# Patient Record
Sex: FEMALE | Race: WHITE | NOT HISPANIC OR LATINO | Employment: UNEMPLOYED | URBAN - METROPOLITAN AREA
[De-identification: names, ages, dates, MRNs, and addresses within clinical notes are randomized per-mention and may not be internally consistent; named-entity substitution may affect disease eponyms.]

---

## 2023-09-28 ENCOUNTER — TRANSCRIBE ORDERS (OUTPATIENT)
Dept: GENERAL RADIOLOGY | Facility: HOSPITAL | Age: 23
End: 2023-09-28
Payer: COMMERCIAL

## 2023-09-28 DIAGNOSIS — H47.10 PAPILLEDEMA: Primary | ICD-10-CM

## 2023-10-26 ENCOUNTER — HOSPITAL ENCOUNTER (OUTPATIENT)
Dept: GENERAL RADIOLOGY | Facility: HOSPITAL | Age: 23
Discharge: HOME | End: 2023-10-26
Admitting: OPTOMETRIST
Payer: COMMERCIAL

## 2023-10-26 VITALS
SYSTOLIC BLOOD PRESSURE: 120 MMHG | BODY MASS INDEX: 35.85 KG/M2 | HEIGHT: 64 IN | DIASTOLIC BLOOD PRESSURE: 80 MMHG | RESPIRATION RATE: 14 BRPM | OXYGEN SATURATION: 98 % | WEIGHT: 210 LBS | HEART RATE: 83 BPM | TEMPERATURE: 98 F

## 2023-10-26 DIAGNOSIS — H47.10 PAPILLEDEMA: ICD-10-CM

## 2023-10-26 LAB
APPEARANCE CSF: CLEAR
COLOR CSF: COLORLESS
GLUCOSE CSF-MCNC: 57 MG/DL (ref 40–70)
METHOD: NORMAL
NUC CELL # CSF MANUAL: 0 /MM3 (ref 0–5)
PROT CSF-MCNC: 17.6 MG/DL (ref 15–45)
RBC # CSF MANUAL: 0 /MM3 (ref 0–0)
SPECIMEN VOL CSF: 2 ML
TUBE # CSF: 3

## 2023-10-26 PROCEDURE — 89050 BODY FLUID CELL COUNT: CPT | Performed by: OPTOMETRIST

## 2023-10-26 PROCEDURE — 82945 GLUCOSE OTHER FLUID: CPT | Performed by: OPTOMETRIST

## 2023-10-26 PROCEDURE — 84157 ASSAY OF PROTEIN OTHER: CPT | Performed by: OPTOMETRIST

## 2023-10-26 PROCEDURE — 25010000002 LIDOCAINE 1 % SOLUTION: Performed by: RADIOLOGY

## 2023-10-26 RX ORDER — ALPRAZOLAM 0.5 MG/1
0.5 TABLET ORAL ONCE
Status: COMPLETED | OUTPATIENT
Start: 2023-10-26 | End: 2023-10-26

## 2023-10-26 RX ORDER — LIDOCAINE HYDROCHLORIDE 10 MG/ML
10 INJECTION, SOLUTION INFILTRATION; PERINEURAL ONCE
Status: COMPLETED | OUTPATIENT
Start: 2023-10-26 | End: 2023-10-26

## 2023-10-26 RX ORDER — MECLIZINE HYDROCHLORIDE 25 MG/1
25 TABLET ORAL 3 TIMES DAILY PRN
COMMUNITY

## 2023-10-26 RX ADMIN — ALPRAZOLAM 0.5 MG: 0.5 TABLET ORAL at 12:51

## 2023-10-26 RX ADMIN — LIDOCAINE HYDROCHLORIDE 3 ML: 10 INJECTION, SOLUTION INFILTRATION; PERINEURAL at 13:42

## 2023-10-26 NOTE — INTERVAL H&P NOTE
H&P reviewed. The patient was examined and there are no changes to the H&P.   Unremark.for intended procedure

## 2023-10-26 NOTE — DISCHARGE INSTRUCTIONS
EDUCATION /DISCHARGE INSTRUCTION   A lumbar puncture involves insertion of a sterile needle into the spinal canal by the physician   This procedure is performed for several reasons: to detect increased intracranial pressure, the presence of blood in cerebrospinal fluid (CFS), to obtain CSF specimens for laboratory studies, to administer drugs and to relieve pressure by removing CSF.    You will lie on your stomach with a pillow under your stomach.  This opens the vertebral space to allow access to the spinal needle.  The physician will sterilize the area using antiseptic solution and numb the area where the spinal needle will be placed.  If CSF is being removed for specimen, you may be asked to push or beardown to assist with the flow of the CSF. When the procedure is complete, a band aid will be placed over the injection site and you will be taken to the recovery area.    POTENTIAL RISKS OF A LUMBAR PUNCTURE INCLUDE BUT ARE NOT LIMITED TO:  *  Headache    *  Swelling at puncture site  *  Bleeding into the spinal canal *  Temporary difficulty urinating  *  Leakage of CSF   *  Fever  *  Pain caused by nerve irritation    BENEFIT OF PROCEDURE:  This is the only way to obtain spinal fluid or relieve pressure due to increased CSF.  There is no alternative.    FOLLOWING A LUMBAR PUNCTURE:  *  Drink plenty of liquids -  Caffeine is recommended   *  Lie down flat for the next 8 hours.  If you get a headache, lie down flat for 24 hours, continue to force fluids and call your doctor if  the headache persists.  *  Go straight home.  DO NOT DRIVE    CALL YOUR DOCTOR IF YOU HAVE:  *  A severe headache that will not go away  *  Redness, swelling or drainage from the puncture site  *  Neck stiffness, numbness or weakness  *  Any new or severe symptoms    Following the procedure, you should continue to take all of your medications as directed by your primary physician unless otherwise instructed.  There have been no changes to the  medications you provided us (with the following exceptions if applicable):    YOU ARE THE MOST IMPORTANT FACTOR IN YOUR RECOVERY.  IF YOU HAVE QUESTIONS OR CONCERNS PLEASE CALL THE RADIOLOGY NURSES AT [phone].

## 2023-10-27 ENCOUNTER — TELEPHONE (OUTPATIENT)
Dept: RADIOLOGY | Facility: HOSPITAL | Age: 23
End: 2023-10-27
Payer: COMMERCIAL

## 2023-10-27 ENCOUNTER — TELEPHONE (OUTPATIENT)
Dept: INTERVENTIONAL RADIOLOGY/VASCULAR | Facility: HOSPITAL | Age: 23
End: 2023-10-27
Payer: COMMERCIAL

## 2023-10-27 NOTE — TELEPHONE ENCOUNTER
Patient called to report she is unable to sit up without getting a headache but it does relieve with laying down. She states she was drinking caffeine yesterday but has only been drinking Gatorade and water intermittently today. She took Ibuprofen 400 mg PO around 0900 this am and 600 mg at 1900. We discussed a possibility of needing a blood patch at this point but I did tell her that this would not even be a possibility until tomorrow. We discussed going to the ED if she felt her headache was severe enough. We agreed for her to continue to lay flat for the next few hours and push caffeine again. She agreed. I will call her back to check on her this evening. She agreed with this plan at this time.

## 2023-10-28 NOTE — TELEPHONE ENCOUNTER
Patient called to check on her headache with sitting and standing post LP yesterday. She reports no issues at present since I spoke with her a few hours ago. She states she has not moved around much but seems better. I encouraged her to continue meds along with bedrest, pushing fluids and caffeine.   I told her if the headache continued with sitting and standing then she needed to call triage first thing at 0700 am. She verbalized understanding.

## 2023-10-29 ENCOUNTER — TELEPHONE (OUTPATIENT)
Dept: INTERVENTIONAL RADIOLOGY/VASCULAR | Facility: HOSPITAL | Age: 23
End: 2023-10-29
Payer: COMMERCIAL

## 2023-10-29 NOTE — TELEPHONE ENCOUNTER
"Patient called stating that she had a headache upon standing on Friday, she felt fine Saturday morning, headache came back Saturday night and into today. She stated the headache is \"tolerable\" but annoying. Advised patient to lie flat for another 24 hours, take tylenol every 6 hrs, and drink caffeine and fluids again. Call back in the morning if symptoms get worse or don't improve. Patient verbalized understanding and agreed.  "

## 2023-10-30 ENCOUNTER — ANESTHESIA EVENT (OUTPATIENT)
Dept: PAIN MEDICINE | Facility: HOSPITAL | Age: 23
End: 2023-10-30
Payer: COMMERCIAL

## 2023-10-30 ENCOUNTER — HOSPITAL ENCOUNTER (OUTPATIENT)
Dept: GENERAL RADIOLOGY | Facility: HOSPITAL | Age: 23
Discharge: HOME | End: 2023-10-30
Payer: COMMERCIAL

## 2023-10-30 ENCOUNTER — HOSPITAL ENCOUNTER (OUTPATIENT)
Dept: PAIN MEDICINE | Facility: HOSPITAL | Age: 23
Discharge: HOME | End: 2023-10-30
Payer: COMMERCIAL

## 2023-10-30 ENCOUNTER — TELEPHONE (OUTPATIENT)
Dept: INTERVENTIONAL RADIOLOGY/VASCULAR | Facility: HOSPITAL | Age: 23
End: 2023-10-30
Payer: COMMERCIAL

## 2023-10-30 ENCOUNTER — ANESTHESIA (OUTPATIENT)
Dept: PAIN MEDICINE | Facility: HOSPITAL | Age: 23
End: 2023-10-30
Payer: COMMERCIAL

## 2023-10-30 VITALS
WEIGHT: 210 LBS | HEIGHT: 64 IN | DIASTOLIC BLOOD PRESSURE: 93 MMHG | OXYGEN SATURATION: 98 % | BODY MASS INDEX: 35.85 KG/M2 | TEMPERATURE: 98 F | HEART RATE: 92 BPM | RESPIRATION RATE: 16 BRPM | SYSTOLIC BLOOD PRESSURE: 125 MMHG

## 2023-10-30 DIAGNOSIS — R52 PAIN: ICD-10-CM

## 2023-10-30 PROCEDURE — 25010000002 FENTANYL CITRATE (PF) 50 MCG/ML SOLUTION: Performed by: ANESTHESIOLOGY

## 2023-10-30 PROCEDURE — 77003 FLUOROGUIDE FOR SPINE INJECT: CPT

## 2023-10-30 PROCEDURE — 25510000001 IOPAMIDOL 41 % SOLUTION: Performed by: ANESTHESIOLOGY

## 2023-10-30 PROCEDURE — 25010000002 MIDAZOLAM PER 1 MG: Performed by: ANESTHESIOLOGY

## 2023-10-30 RX ORDER — IOPAMIDOL 408 MG/ML
10 INJECTION, SOLUTION INTRATHECAL
Status: COMPLETED | OUTPATIENT
Start: 2023-10-30 | End: 2023-10-30

## 2023-10-30 RX ORDER — IBUPROFEN 200 MG
200 TABLET ORAL EVERY 6 HOURS PRN
COMMUNITY

## 2023-10-30 RX ORDER — FENTANYL CITRATE 50 UG/ML
50 INJECTION, SOLUTION INTRAMUSCULAR; INTRAVENOUS ONCE
Status: COMPLETED | OUTPATIENT
Start: 2023-10-30 | End: 2023-10-30

## 2023-10-30 RX ORDER — MIDAZOLAM HYDROCHLORIDE 1 MG/ML
1 INJECTION INTRAMUSCULAR; INTRAVENOUS ONCE AS NEEDED
Status: COMPLETED | OUTPATIENT
Start: 2023-10-30 | End: 2023-10-30

## 2023-10-30 RX ORDER — LIDOCAINE HYDROCHLORIDE 10 MG/ML
1 INJECTION, SOLUTION INFILTRATION; PERINEURAL ONCE
Status: DISCONTINUED | OUTPATIENT
Start: 2023-10-30 | End: 2023-10-31 | Stop reason: HOSPADM

## 2023-10-30 RX ORDER — SODIUM CHLORIDE 9 MG/ML
40 INJECTION, SOLUTION INTRAVENOUS AS NEEDED
Status: DISCONTINUED | OUTPATIENT
Start: 2023-10-30 | End: 2023-10-31 | Stop reason: HOSPADM

## 2023-10-30 RX ORDER — ACETAZOLAMIDE 500 MG/1
1 CAPSULE, EXTENDED RELEASE ORAL EVERY 12 HOURS SCHEDULED
COMMUNITY
Start: 2023-10-27

## 2023-10-30 RX ORDER — SODIUM CHLORIDE 0.9 % (FLUSH) 0.9 %
10 SYRINGE (ML) INJECTION AS NEEDED
Status: DISCONTINUED | OUTPATIENT
Start: 2023-10-30 | End: 2023-10-31 | Stop reason: HOSPADM

## 2023-10-30 RX ORDER — SODIUM CHLORIDE 0.9 % (FLUSH) 0.9 %
10 SYRINGE (ML) INJECTION EVERY 12 HOURS SCHEDULED
Status: DISCONTINUED | OUTPATIENT
Start: 2023-10-30 | End: 2023-10-31 | Stop reason: HOSPADM

## 2023-10-30 RX ADMIN — MIDAZOLAM 1 MG: 1 INJECTION INTRAMUSCULAR; INTRAVENOUS at 13:12

## 2023-10-30 RX ADMIN — IOPAMIDOL 10 ML: 408 INJECTION, SOLUTION INTRATHECAL at 13:12

## 2023-10-30 RX ADMIN — FENTANYL CITRATE 50 MCG: 50 INJECTION, SOLUTION INTRAMUSCULAR; INTRAVENOUS at 13:12

## 2023-10-30 NOTE — H&P
"[facility]    History and Physical    Patient Name: Eufemia Caruso  :  2000  MRN:  [id number]  Date of Admission: 10/30/2023    Subjective     Patient is a 23 y.o. female presents with chief complaint of acute headache pain.  Onset of symptoms was abrupt starting a fewdays ago.  Symptoms are associated/aggravated by nothing in particular. Symptoms improve with lying down    The following portions of the patients history were reviewed and updated as appropriate: current medications, allergies, past medical history, past surgical history, past family history, past social history, and problem list      She had a recent lumbar puncture at L2-3 and radiology    She subsequently developed a positional headache and is here for epidural blood patch      Objective     Past Medical History:   Past Medical History:   Diagnosis Date   • Allergic rhinitis      Past Surgical History:   Past Surgical History:   Procedure Laterality Date   • TONSILLECTOMY Bilateral      Family History: History reviewed. No pertinent family history.  Social History:   Social History     Socioeconomic History   • Marital status: Single   Tobacco Use   • Smoking status: Every Day     Types: Cigarettes   • Tobacco comments:     Pt. Vapes daily   Substance and Sexual Activity   • Alcohol use: Yes     Alcohol/week: 1.0 standard drink of alcohol     Types: 1 Glasses of wine per week   • Drug use: Not Currently   • Sexual activity: Defer       Vital Signs Range for the last 24 hours  Temperature: Temp:  [36.7 °C (98 °F)] 36.7 °C (98 °F)   Temp Source: Temp src: Oral   BP: BP: (145)/(98) 145/98   Pulse: Heart Rate:  [99] 99   Respirations: Resp:  [16] 16   SPO2: SpO2:  [100 %] 100 %   O2 Amount (l/min):     O2 Devices Device (Oxygen Therapy): room air   Weight: Weight:  [95.3 kg (210 lb)] 95.3 kg (210 lb)     Flowsheet Rows      Flowsheet Row First Filed Value   Admission Height 162.6 cm (64\") Documented at 10/30/2023 1157   Admission Weight " 95.3 kg (210 lb) Documented at 10/30/2023 1157            --------------------------------------------------------------------------------    Current Outpatient Medications   Medication Sig Dispense Refill   • meclizine (ANTIVERT) 25 MG tablet Take 1 tablet by mouth 3 (Three) Times a Day As Needed for Nausea.     • acetaZOLAMIDE (DIAMOX) 500 MG capsule Take 1 capsule by mouth Every 12 (Twelve) Hours.     • ibuprofen (ADVIL,MOTRIN) 200 MG tablet Take 1 tablet by mouth Every 6 (Six) Hours As Needed for Mild Pain. Pt. Taking the max amount of ibuprofen daily. Pt. States that she took 1200mg yesterday.       Current Facility-Administered Medications   Medication Dose Route Frequency Provider Last Rate Last Admin   • fentaNYL citrate (PF) (SUBLIMAZE) injection 50 mcg  50 mcg Intravenous Once RenderEarl MD       • iopamidol (ISOVUE-M 200) injection 41%  10 mL Epidural Once in imaging Earl Mcqueen MD       • lidocaine (XYLOCAINE) 1 % injection 1 mL  1 mL Intradermal Once Earl Mcqueen MD       • midazolam (VERSED) injection 1 mg  1 mg Intravenous Once PRN Earl Mcqueen MD       • sodium chloride 0.9 % flush 10 mL  10 mL Intravenous Q12H Earl Mcqueen MD       • sodium chloride 0.9 % flush 10 mL  10 mL Intravenous PRN Earl Mcqueen MD       • sodium chloride 0.9 % infusion 40 mL  40 mL Intravenous PRN Earl Mcqueen MD           --------------------------------------------------------------------------------  Assessment & Plan      Anesthesia Evaluation           Pain impairs ability to perform ADLs: Ambulation       Airway   Mallampati: II  Dental      Pulmonary     breath sounds clear to auscultation  Cardiovascular     Rhythm: regular        Neuro/Psych    ROS Comment: Pseudotumor cerebri  GI/Hepatic/Renal/Endo      Musculoskeletal     Abdominal    Substance History      OB/GYN          Other                 Diagnosis and Plan    Treatment Plan  ASA 2      Procedures: Nerve block  type: Epidural blood patch., With fluoroscopy,      Anesthetic plan and risks discussed with patient.      She understands that it could recur.  She understands she may have some lumbago or radicular symptoms associated with injection of blood.  She understands bleeding and infection or seizure are very rare complications.  Diagnosis     * Post-dural puncture headache [G97.1]

## 2023-10-30 NOTE — ADDENDUM NOTE
Addendum  created 10/30/23 1340 by Render, Earl Car MD    Clinical Note Signed, SmartForm saved

## 2023-10-30 NOTE — TELEPHONE ENCOUNTER
Pt called and stated she is still having s/sx post op from LP done on 10/26/23 Thursday.  She has increased her fluids, and caffeine and has remained less active pretty much on bedrest and her headache has persisted and she is unable to stand for long periods of time.  Info taken and RN called [facility] and gave information. Pt aware someone from [facility] will call her.

## 2024-03-06 ENCOUNTER — OFFICE VISIT (OUTPATIENT)
Dept: NEUROLOGY | Facility: CLINIC | Age: 24
End: 2024-03-06
Payer: COMMERCIAL

## 2024-03-06 VITALS
WEIGHT: 203 LBS | SYSTOLIC BLOOD PRESSURE: 126 MMHG | HEIGHT: 64 IN | HEART RATE: 81 BPM | BODY MASS INDEX: 34.66 KG/M2 | DIASTOLIC BLOOD PRESSURE: 82 MMHG | OXYGEN SATURATION: 99 %

## 2024-03-06 DIAGNOSIS — G93.2 BENIGN INTRACRANIAL HYPERTENSION: Primary | ICD-10-CM

## 2024-03-06 RX ORDER — ERGOCALCIFEROL 1.25 MG/1
50000 CAPSULE ORAL
COMMUNITY

## 2024-03-06 NOTE — PROGRESS NOTES
Chief Complaint   Patient presents with    Papilledema       Patient ID: Eufemia Caruso is a 23 y.o. female.    HPI: I have had the pleasure of seeing your patient today.  As you may know she is a 23-year-old female being seen at the request of and referred to us by Dr. Gonzalez for history of benign intracranial hypertension.  The patient says that she was seen for an eye exam and was noted to have papilledema.  She was then referred for further testing.  She did have an MRI of her brain on 9/25/2023 which showed minimal flattening of the posterior globe on both sides.  Both optic nerve sheaths are somewhat prominent and tortuous on that evaluation.  The rest of the brain was within normal limits.  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  We are in the process of retrieving that.  She was started on Diamox.  This was titrated to a dose of 500 mg 4 times daily.  This has improved upon some of the optic nerve swelling.  She is stable however still has optic nerve swelling according to reports.  She was seen by a neurovascular specialist who weighed the options of shunt versus stent placement.  It was agreed that stent placement would likely be the surgical option however it was agreed that they would try weight loss methods first.  She states that that has been hard to come by, however she is trying.  She is no longer on oral contraceptives.  She has not had any severe head trauma.  She has not taken any vitamin A derivative medications.  No chronic steroids.  No pregnancy.    The following portions of the patient's history were reviewed and updated as appropriate: allergies, current medications, past family history, past medical history, past social history, past surgical history and problem list.    Review of Systems   Neurological:  Negative for dizziness, tremors, seizures, syncope, facial asymmetry, speech difficulty,  weakness, light-headedness, numbness and headaches.   Psychiatric/Behavioral:  Negative for agitation, behavioral problems, confusion, decreased concentration, dysphoric mood, hallucinations, self-injury, sleep disturbance and suicidal ideas. The patient is not nervous/anxious and is not hyperactive.       I have reviewed the review of systems above performed by my medical assistant.      Vitals:    24 1515   BP: 126/82   Pulse: 81   SpO2: 99%       Neurologic Exam     Mental Status   Oriented to person, place, and time.   Registration: recalls 3 of 3 objects. Follows 3 step commands.   Attention: normal. Concentration: normal.   Speech: speech is normal   Level of consciousness: alert  Knowledge: consistent with education (No deficits found.).   Normal comprehension.     Cranial Nerves     CN II   Visual fields full to confrontation.     CN III, IV, VI   Pupils are equal, round, and reactive to light.  Extraocular motions are normal.   CN III: no CN III palsy  CN VI: no CN VI palsy  Nystagmus: none   Diplopia: none    CN V   Facial sensation intact.     CN VII   Facial expression full, symmetric.     CN VIII   CN VIII normal.     CN IX, X   CN IX normal.   CN X normal.     CN XI   CN XI normal.     CN XII   CN XII normal.     Motor Exam   Muscle bulk: normal  Right arm tone: normal  Left arm tone: normal  Right leg tone: normal  Left leg tone: normal    Strength   Right neck flexion: 5/5  Left neck flexion: 5/5  Right neck extension: 5/5  Left neck extension: 5/5  Right deltoid: 5/5  Left deltoid: 5/5  Right biceps: 5/5  Left biceps: 5/5  Right triceps: 5/5  Left triceps: 5/5  Right wrist flexion: 5/5  Left wrist flexion: 5/5  Right wrist extension: 5/5  Left wrist extension: 5/5  Right interossei: 5/5  Left interossei: 5/5  Right abdominals: 5/5  Left abdominals: 5/5  Right iliopsoas: 5/5  Left iliopsoas: 5/5  Right quadriceps: 5/5  Left quadriceps: 5/5  Right hamstrin/5  Left hamstrin/5  Right  glutei: 5/5  Left glutei: 5/5  Right anterior tibial: 5/5  Left anterior tibial: 5/5  Right posterior tibial: 5/5  Left posterior tibial: 5/5  Right peroneal: 5/5  Left peroneal: 5/5  Right gastroc: 5/5  Left gastroc: 5/5    Sensory Exam   Light touch normal.   Vibration normal.   Proprioception normal.   Pinprick normal.     Gait, Coordination, and Reflexes     Gait  Gait: normal    Coordination   Romberg: negative    Tremor   Resting tremor: absent  Intention tremor: absent    Reflexes   Right brachioradialis: 2+  Left brachioradialis: 2+  Right biceps: 2+  Left biceps: 2+  Right triceps: 2+  Left triceps: 2+  Right patellar: 2+  Left patellar: 2+  Right achilles: 2+  Left achilles: 2+  Right : 2+  Left : 2+Station is normal.       Physical Exam  Vitals reviewed.   Constitutional:       General: She is not in acute distress.     Appearance: She is well-developed.   HENT:      Head: Normocephalic and atraumatic.   Eyes:      Extraocular Movements: EOM normal.      Pupils: Pupils are equal, round, and reactive to light.   Cardiovascular:      Rate and Rhythm: Normal rate and regular rhythm.      Heart sounds: Normal heart sounds.   Pulmonary:      Effort: Pulmonary effort is normal. No respiratory distress.      Breath sounds: Normal breath sounds.   Abdominal:      General: Bowel sounds are normal. There is no distension.      Palpations: Abdomen is soft.      Tenderness: There is no abdominal tenderness.   Musculoskeletal:         General: No deformity.      Cervical back: Normal range of motion.   Skin:     General: Skin is warm.      Findings: No rash.   Neurological:      Mental Status: She is oriented to person, place, and time.      Coordination: Romberg Test normal.      Gait: Gait is intact.      Deep Tendon Reflexes:      Reflex Scores:       Tricep reflexes are 2+ on the right side and 2+ on the left side.       Bicep reflexes are 2+ on the right side and 2+ on the left side.       Brachioradialis  reflexes are 2+ on the right side and 2+ on the left side.       Patellar reflexes are 2+ on the right side and 2+ on the left side.       Achilles reflexes are 2+ on the right side and 2+ on the left side.  Psychiatric:         Speech: Speech normal.         Judgment: Judgment normal.         Procedures    Assessment/Plan: We had a long discussion about her symptoms and process.  It is somewhat complicated however I do agree that pursuing stent placement without first trying weight loss methods would not be prudent.  Particularly since she has had normal visual field testing and does not appear to be losing vision or having any significant compromise in that way.  However if this method is not helpful, and/or we begin to see changes of vision objectively via visual field testing, we would want to move forward with the stent placement.  She does agree.  She does have another ophthalmologically assessment soon.  We will see her back in a few months to review.  A total of 45 minutes was spent face-to-face with the patient today.  Of that greater than 50% of this time was spent discussing signs and symptoms of benign intracranial hypertension, patient education, plan of care and prognosis.         Diagnoses and all orders for this visit:    1. Benign intracranial hypertension (Primary)           Ruy Cardona II, MD

## 2024-03-08 ENCOUNTER — PATIENT ROUNDING (BHMG ONLY) (OUTPATIENT)
Dept: NEUROLOGY | Facility: CLINIC | Age: 24
End: 2024-03-08
Payer: COMMERCIAL

## 2024-06-14 ENCOUNTER — OFFICE VISIT (OUTPATIENT)
Dept: NEUROLOGY | Facility: CLINIC | Age: 24
End: 2024-06-14
Payer: COMMERCIAL

## 2024-06-14 VITALS
HEART RATE: 70 BPM | RESPIRATION RATE: 20 BRPM | HEIGHT: 64 IN | OXYGEN SATURATION: 96 % | WEIGHT: 212 LBS | SYSTOLIC BLOOD PRESSURE: 114 MMHG | DIASTOLIC BLOOD PRESSURE: 80 MMHG | BODY MASS INDEX: 36.19 KG/M2

## 2024-06-14 DIAGNOSIS — G93.2 BENIGN INTRACRANIAL HYPERTENSION: Primary | ICD-10-CM

## 2024-06-14 PROCEDURE — 99213 OFFICE O/P EST LOW 20 MIN: CPT | Performed by: PSYCHIATRY & NEUROLOGY

## 2024-06-14 NOTE — PROGRESS NOTES
Chief Complaint   Patient presents with    Benign intracranial hypertension       Patient ID: Eufemia Caruso is a 23 y.o. female.    HPI:  I have had the pleasure of seeing your patient again today.  As you may know she is a 23-year-old female here for the management of benign intracranial hypertension.  She does still take the Diamox 1000 mg in the morning and 1000 mg at night.  Her last ophthalmologic evaluation was favorable according to the patient.  I do not have the report of the latest visit.  However she indicates that things look good, her visual fields are stable in fact have improved since initial visit, and no significant swelling.  She denies any side effects to the Diamox.  We have talked about weight loss in the past.  She states that the last few months have been somewhat stressful therefore not much weight loss has been had.  No visual blackouts.  No spots in her visual fields.    The following portions of the patient's history were reviewed and updated as appropriate: allergies, current medications, past family history, past medical history, past social history, past surgical history and problem list.    Review of Systems   Musculoskeletal:  Negative for gait problem.   Neurological:  Negative for dizziness, tremors, seizures, syncope, facial asymmetry, speech difficulty, weakness, light-headedness, numbness and headaches.   Psychiatric/Behavioral:  Negative for agitation, behavioral problems, confusion, decreased concentration, dysphoric mood, hallucinations, self-injury, sleep disturbance and suicidal ideas. The patient is not nervous/anxious and is not hyperactive.       I have reviewed the review of systems above performed by my medical assistant.      Vitals:    06/14/24 1517   BP: 114/80   Pulse: 70   Resp: 20   SpO2: 96%       Neurologic Exam     Mental Status   Oriented to person, place, and time.   Concentration: normal.   Level of consciousness: alert  Knowledge: consistent with  education (No deficits found.).     Cranial Nerves     CN II   Visual fields full to confrontation.     CN III, IV, VI   Pupils are equal, round, and reactive to light.  Extraocular motions are normal.   CN III: no CN III palsy  CN VI: no CN VI palsy    CN V   Facial sensation intact.     CN VII   Facial expression full, symmetric.     CN VIII   CN VIII normal.     CN IX, X   CN IX normal.   CN X normal.     CN XI   CN XI normal.     CN XII   CN XII normal.     Motor Exam     Strength   Right neck flexion: 5/5  Left neck flexion: 5/5  Right neck extension: 5/5  Left neck extension: 5/5  Right deltoid: 5/5  Left deltoid: 5/5  Right biceps: 5/5  Left biceps: 5/5  Right triceps: 5/5  Left triceps: 5/5  Right wrist flexion: 5/5  Left wrist flexion: 5/5  Right wrist extension: 5/5  Left wrist extension: 5/5  Right interossei: 5/5  Left interossei: 5/5  Right abdominals: 5/5  Left abdominals: 5/5  Right iliopsoas: 5/5  Left iliopsoas: 5/5  Right quadriceps: 5/5  Left quadriceps: 5/5  Right hamstrin/5  Left hamstrin/5  Right glutei: 5/5  Left glutei: 5/5  Right anterior tibial: 5/5  Left anterior tibial: 5/5  Right posterior tibial: 5/5  Left posterior tibial: 5/5  Right peroneal: 5/5  Left peroneal: 5/5  Right gastroc: 5/5  Left gastroc: 5/5    Sensory Exam   Light touch normal.   Vibration normal.     Gait, Coordination, and Reflexes     Gait  Gait: normal    Reflexes   Right brachioradialis: 2+  Left brachioradialis: 2+  Right biceps: 2+  Left biceps: 2+  Right triceps: 2+  Left triceps: 2+  Right patellar: 2+  Left patellar: 2+  Right achilles: 2+  Left achilles: 2+  Right : 2+  Left : 2+Station is normal.       Physical Exam  Vitals reviewed.   Constitutional:       Appearance: She is well-developed.   HENT:      Head: Normocephalic and atraumatic.   Eyes:      Extraocular Movements: EOM normal.      Pupils: Pupils are equal, round, and reactive to light.   Cardiovascular:      Rate and Rhythm: Normal  rate and regular rhythm.   Pulmonary:      Breath sounds: Normal breath sounds.   Musculoskeletal:         General: Normal range of motion.   Skin:     General: Skin is warm.   Neurological:      Mental Status: She is oriented to person, place, and time.      Gait: Gait is intact.      Deep Tendon Reflexes:      Reflex Scores:       Tricep reflexes are 2+ on the right side and 2+ on the left side.       Bicep reflexes are 2+ on the right side and 2+ on the left side.       Brachioradialis reflexes are 2+ on the right side and 2+ on the left side.       Patellar reflexes are 2+ on the right side and 2+ on the left side.       Achilles reflexes are 2+ on the right side and 2+ on the left side.        Procedures    Assessment/Plan: We are going to continue with the current dosing for the Diamox as scheduled.  She will be seeing her ophthalmology team again soon and therefore we will see her back in October and further detail next steps with respect to the Diamox versus stenting.  A total of 25 minutes was spent face-to-face with the patient today.  Of that greater than 50% of this time was spent discussing signs and symptoms of benign intracranial hypertension, patient education, plan of care and prognosis.         Diagnoses and all orders for this visit:    1. Benign intracranial hypertension (Primary)           Ruy Cardona II, MD

## 2024-09-11 ENCOUNTER — TELEPHONE (OUTPATIENT)
Dept: NEUROLOGY | Facility: CLINIC | Age: 24
End: 2024-09-11
Payer: COMMERCIAL

## 2024-09-11 NOTE — TELEPHONE ENCOUNTER
LVM informing the patient that we are r/s appt due to provider being out of office. Had sent a mail reminder and [employer] Message (if applicable) on next available appt date for OCT 24th at 4:00PM

## 2024-10-28 ENCOUNTER — OFFICE VISIT (OUTPATIENT)
Dept: NEUROLOGY | Facility: CLINIC | Age: 24
End: 2024-10-28
Payer: COMMERCIAL

## 2024-10-28 VITALS
DIASTOLIC BLOOD PRESSURE: 84 MMHG | SYSTOLIC BLOOD PRESSURE: 112 MMHG | RESPIRATION RATE: 20 BRPM | BODY MASS INDEX: 37.56 KG/M2 | WEIGHT: 220 LBS | HEIGHT: 64 IN

## 2024-10-28 DIAGNOSIS — G93.2 IDIOPATHIC INTRACRANIAL HYPERTENSION: Primary | ICD-10-CM

## 2024-10-28 PROBLEM — Q21.12 PATENT FORAMEN OVALE: Status: ACTIVE | Noted: 2021-05-25

## 2024-10-28 PROBLEM — H47.11 PAPILLEDEMA ASSOCIATED WITH INCREASED INTRACRANIAL PRESSURE: Status: ACTIVE | Noted: 2023-12-07

## 2024-10-28 PROBLEM — I49.8 OTHER SPECIFIED CARDIAC ARRHYTHMIAS: Status: ACTIVE | Noted: 2021-05-25

## 2024-10-28 NOTE — PROGRESS NOTES
DOS: 10/28/2024  NAME: Eufemia Caruso   : 2000  PCP: Heaven Charles APRN    Chief Complaint   Patient presents with    Benign intracranial hypertension      SUBJECTIVE  Neurological Problem:  24 y.o. right-handed female with a past medical history of idiopathic intracranial hypertension with papilledema, . They are seen in follow up today for idiopathic intracranial hypertension, however the problem is new to the examiner. Patient last seen by Dr. Ruy Cardona in 2024, with a summary of the history taken from the previous note with additions/modifications as indicated. She is unaccompanied.    Interval History:   Miss Caruso is a patient of Dr. Ruy Cardona's, previously seen in March and 2024 for the evaluation and management of idiopathic intracranial hypertension.  Per review of chart she was previously seen for an eye exam and noted to have papilledema.  She had a MRI brain in 2023 which showed minimal flattening of the posterior globe on both sides, bilateral optic nerve sheaths somewhat prominent and tortuous on that evaluation.  MRI/MRV completed which showed narrowing of right transverse sinus, no report or imaging to review of this.  She was initiated on Diamox titrated to 500 mg 4 times daily.  Was evaluated by Dr. Lowry in neurosurgery, he recommended no treatment unless vision was affected.  She denied a history of severe head trauma.  She denied any history of pregnancy, not on chronic steroids, had discontinued oral contraceptives and was not taking any vitamin A derivative medications.  She was last seen in 2024, no changes made to her Diamox dosing of 2 gm daily.  She was maintaining follow-ups with her ophthalmologist.    She presents today in follow-up, denies any vision change, no double vision or loss of vision.  She was last seen by her ophthalmologist in September, review of notes indicate stability of her optic nerve swelling, documented as very mildly  "swollen with no symptoms.  It was recommended she reduce Diamox to 1000 mg daily, split into two 500 mg doses.  It was asked that she follow-up again in 3 months, so she is scheduled for December.  She said after decreasing her Diamox dose she did experience a mild headache for a few days however it did not last long and since adjusting to the new dose she has not experienced any further headache.  She denies any focal or unilateral symptoms, no visual or speech deficit, no facial droop, no weakness or numbness to the unilateral extremities.  She continues to remain off oral birth control or any hormonal medications, does not take steroids regularly, no pregnancies.  She does use Retin-A on her skin but does not take any systemic vitamin A.    Review of Systems   Musculoskeletal:  Negative for gait problem.   Neurological:  Negative for dizziness, tremors, seizures, syncope, facial asymmetry, speech difficulty, weakness, light-headedness, numbness and headaches.   Psychiatric/Behavioral:  Negative for agitation, behavioral problems, confusion, decreased concentration, dysphoric mood, hallucinations, self-injury, sleep disturbance and suicidal ideas. The patient is not nervous/anxious and is not hyperactive.         The following portions of the patient's history were reviewed and updated as appropriate: allergies, current medications, past family history, past medical history, past social history, past surgical history and problem list.    Current Medications:   Current Outpatient Medications:     acetaZOLAMIDE (DIAMOX) 500 MG capsule, Take 1 capsule by mouth 4 (Four) Times a Day., Disp: , Rfl:   **I did not stop or change the above medications.  Patient's medication list was updated to reflect medications they have reported as currently taking, including medication changes made by other providers.    Objective   Vital Signs:  /84   Resp 20   Ht 162.6 cm (64.02\")   Wt 99.8 kg (220 lb)   BMI 37.74 kg/m² " "  Body mass index is 37.74 kg/m².    Physical Exam   Physical Exam:  GENERAL: NAD, alert  HEENT: Normocephalic, atraumatic   Resp: Even and unlabored  Extremities: No edema  Skin: Warm and dry  Psychiatric: Normal mood and affect    Neurological:   MS: AOx3, recent/remote memory intact, normal attention/concentration, language intact, no neglect.   CN: visual acuity grossly normal, PERRL, EOMI, no nystagmus, no facial droop, no dysarthria, hearing symmetric, tongue midline, bilateral shoulder shrug symmetric  Motor: Normal tone and bulk. No tremor or abnormal movements noted. No asterixis.   Trapezius elevation: 5/5  Shoulder abductors 5/5  Elbow flexors 5/5  Elbow extensors 5/5   Left  2+  Right  2+  Hip flexors 5/5  Knee extensors 5/5  Hamstring strength 5/5  Dorsiflexors 5/5  Plantar flexors 5/5  Sensory: Intact to crude touch in all four ext.  Coordination: No dysmetria finger to nose   Rapid alternating movements: Normal finger to thumb tap  Gait and station: Normal gait and station    Result Review :  The following data was reviewed by: AMALIA Iglesias on 10/28/2024:  Laboratory Results:         Lab Results   Component Value Date    WBC 5.26 04/19/2021    HGB 14.3 04/19/2021    HCT 43.2 04/19/2021    MCV 89.1 04/19/2021     04/19/2021     Lab Results   Component Value Date    BUN 7 04/19/2021    CREATININE 0.8 04/19/2021    BCR 9.0 (L) 04/19/2021    K 3.9 04/19/2021    CO2 23 04/19/2021    CALCIUM 9.2 04/19/2021    ALBUMIN 4.5 04/19/2021    LABIL2 1.5 04/19/2021    AST 15 04/19/2021    ALT 15 04/19/2021     Lab Results   Component Value Date    HGBA1C 5.4 11/01/2023     No results found for: \"CHOL\"  Lab Results   Component Value Date    HDL 57 11/26/2019     Lab Results   Component Value Date     (H) 11/26/2019     Lab Results   Component Value Date    TRIG 119 11/26/2019     No results found for: \"RPR\"  Lab Results   Component Value Date    TSH 0.913 04/19/2021     No results found " "for: \"[id number]\"    Data reviewed : Radiologic studies   and Consultant notes           Assessment and Plan   Diagnoses and all orders for this visit:    1. Idiopathic intracranial hypertension (Primary)      Continue Diamox 500 mg twice daily.  Maintain follow-up with ophthalmology, says she is scheduled in December.  She is interested in discontinuing the Diamox when able.  This can be discussed with Dr. Cardona at her next follow-up.    We will see Eufemia back in 6 months with Dr. Cardona, sooner if symptoms warrant.     AMALIA Iglesias  [facility]   10/28/24       I spent 30 minutes caring for Eufemia on this date of service. This time includes time spent by me in the following activities:preparing for the visit, reviewing tests, obtaining and/or reviewing a separately obtained history, performing a medically appropriate examination and/or evaluation , counseling and educating the patient/family/caregiver, ordering medications, tests, or procedures, referring and communicating with other health care professionals , documenting information in the medical record, independently interpreting results and communicating that information with the patient/family/caregiver, and care coordination  Follow Up   No follow-ups on file.  Patient was given instructions and counseling regarding her condition or for health maintenance advice. Please see specific information pulled into the AVS if appropriate.       Dictated using Dragon Dictation  "

## 2025-07-25 ENCOUNTER — OFFICE VISIT (OUTPATIENT)
Dept: NEUROLOGY | Facility: CLINIC | Age: 25
End: 2025-07-25
Payer: COMMERCIAL

## 2025-07-25 VITALS
WEIGHT: 223 LBS | BODY MASS INDEX: 38.07 KG/M2 | HEART RATE: 102 BPM | OXYGEN SATURATION: 98 % | HEIGHT: 64 IN | DIASTOLIC BLOOD PRESSURE: 78 MMHG | SYSTOLIC BLOOD PRESSURE: 126 MMHG

## 2025-07-25 DIAGNOSIS — G93.2 IDIOPATHIC INTRACRANIAL HYPERTENSION: Primary | ICD-10-CM

## 2025-07-25 PROCEDURE — 99214 OFFICE O/P EST MOD 30 MIN: CPT | Performed by: PSYCHIATRY & NEUROLOGY

## 2025-07-25 RX ORDER — ASPIRIN 81 MG/1
81 TABLET ORAL DAILY
COMMUNITY
Start: 2025-04-15 | End: 2026-04-15

## 2025-07-25 RX ORDER — PSYLLIUM HUSK 0.4 G
CAPSULE ORAL
COMMUNITY

## 2025-07-25 NOTE — PROGRESS NOTES
"Chief Complaint   Patient presents with    Benign intracranial hypertension       Patient ID: Eufemia Bishop is a 24 y.o. female.    HPI: I had the pleasure of seeing your patient today.  As you may know she is a 24-year-old female here for the management of benign intracranial hypertension.  She was recently seen by her ophthalmologist who mentioned to the patient that things were \"good\".  We do not yet have the report however her last appointment prior showed no optic nerve swelling.  She is 6 months pregnant and is now off of Diamox since February.  No increasing or worsening headache symptoms.  She denies any change in her vision.    The following portions of the patient's history were reviewed and updated as appropriate: allergies, current medications, past family history, past medical history, past social history, past surgical history and problem list.    Review of Systems   Neurological:  Negative for dizziness, tremors, seizures, syncope, facial asymmetry, speech difficulty, weakness, light-headedness, numbness and headaches.   Psychiatric/Behavioral:  Negative for agitation, behavioral problems, confusion, decreased concentration, dysphoric mood, hallucinations, self-injury, sleep disturbance and suicidal ideas. The patient is not nervous/anxious and is not hyperactive.       I have reviewed the review of systems above performed by my medical assistant.      Vitals:    07/25/25 0918   BP: 126/78   Pulse: 102   SpO2: 98%       Neurological Exam  Mental Status  Awake, alert and oriented to person, place and time. Recent and remote memory are intact. Speech is normal. Language is fluent with no aphasia. Attention and concentration are normal. Fund of knowledge is appropriate for level of education.    Cranial Nerves  CN I: Sense of smell is normal.  CN II: Visual acuity is normal.  CN III, IV, VI: Extraocular movements intact bilaterally. Pupils equal round and reactive to light bilaterally.  CN V: Facial " sensation is normal.  CN VII: Full and symmetric facial movement.  CN XI: Shoulder shrug strength is normal.  CN XII: Tongue midline without atrophy or fasciculations.    Motor  Normal muscle bulk throughout. No fasciculations present. Normal muscle tone. No abnormal involuntary movements. No pronator drift.                                             Right                     Left  Rhomboids                            5                          5  Infraspinatus                          5                          5  Supraspinatus                       5                          5  Deltoid                                   5                          5   Biceps                                   5                          5  Brachioradialis                      5                          5   Triceps                                  5                          5   Pronator                                5                          5   Supinator                              5                           5   Wrist flexor                            5                          5   Wrist extensor                       5                          5   Finger flexor                          5                          5   Finger extensor                     5                          5   Interossei                              5                          5   Abductor pollicis brevis         5                          5   Flexor pollicis brevis             5                          5   Opponens pollicis                 5                          5  Extensor digitorum               5                          5  Abductor digiti minimi           5                          5   Abdominal                            5                          5  Glutei                                    5                          5  Hip abductor                         5                          5  Hip adductor                         5                           5   Iliopsoas                               5                          5   Quadriceps                           5                          5   Hamstring                             5                          5   Gastrocnemius                     5                           5   Anterior tibialis                      5                          5   Posterior tibialis                    5                          5   Peroneal                               5                          5  Ankle dorsiflexor                   5                          5  Ankle plantar flexor              5                           5  Extensor hallucis longus      5                           5    Sensory  Sensation is intact to light touch, pinprick, vibration and proprioception in all four extremities.    Reflexes  Deep tendon reflexes are 2+ and symmetric in all four extremities.    Right pathological reflexes: Mikey's absent.  Left pathological reflexes: Mikey's absent.    Coordination    Finger-to-nose, rapid alternating movements and heel-to-shin normal bilaterally without dysmetria.    Gait  Normal casual, toe, heel and tandem gait.       Physical Exam  Vitals reviewed.   Constitutional:       Appearance: She is well-developed.   HENT:      Head: Normocephalic and atraumatic.   Eyes:      Extraocular Movements: Extraocular movements intact.      Pupils: Pupils are equal, round, and reactive to light.   Cardiovascular:      Rate and Rhythm: Normal rate and regular rhythm.   Pulmonary:      Breath sounds: Normal breath sounds.   Musculoskeletal:         General: Normal range of motion.   Skin:     General: Skin is warm.   Neurological:      Coordination: Coordination is intact.      Deep Tendon Reflexes: Reflexes are normal and symmetric.   Psychiatric:         Speech: Speech normal.         Procedures    Assessment/Plan: We are going to hold off on restarting Diamox since she seems to be doing well off of it for several months  now.  She has also had favorable ophthalmological follow-up.  We will see her in December which will be after delivery of her child.  A total of 30 minutes was spent face-to-face with the patient today.  Of that greater than 50% of this time was spent discussing signs and symptoms of benign intracranial hypertension, patient education, plan of care and prognosis.         Diagnoses and all orders for this visit:    1. Idiopathic intracranial hypertension (Primary)           Ruy Cardona II, MD